# Patient Record
Sex: FEMALE
[De-identification: names, ages, dates, MRNs, and addresses within clinical notes are randomized per-mention and may not be internally consistent; named-entity substitution may affect disease eponyms.]

---
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
## 2025-06-10 ENCOUNTER — APPOINTMENT (OUTPATIENT)
Dept: NEUROLOGY | Facility: CLINIC | Age: 20
End: 2025-06-10
Payer: COMMERCIAL

## 2025-06-10 VITALS
WEIGHT: 100 LBS | SYSTOLIC BLOOD PRESSURE: 106 MMHG | HEIGHT: 63 IN | BODY MASS INDEX: 17.72 KG/M2 | DIASTOLIC BLOOD PRESSURE: 69 MMHG | HEART RATE: 82 BPM

## 2025-06-10 PROBLEM — Z87.42 HISTORY OF PCOS: Status: RESOLVED | Noted: 2025-06-10 | Resolved: 2025-06-10

## 2025-06-10 PROCEDURE — 99205 OFFICE O/P NEW HI 60 MIN: CPT

## 2025-08-04 ENCOUNTER — NON-APPOINTMENT (OUTPATIENT)
Age: 20
End: 2025-08-04

## 2025-08-07 ENCOUNTER — APPOINTMENT (OUTPATIENT)
Dept: NEUROLOGY | Facility: CLINIC | Age: 20
End: 2025-08-07
Payer: COMMERCIAL

## 2025-08-07 ENCOUNTER — TRANSCRIPTION ENCOUNTER (OUTPATIENT)
Age: 20
End: 2025-08-07

## 2025-08-07 VITALS
TEMPERATURE: 98.2 F | DIASTOLIC BLOOD PRESSURE: 76 MMHG | BODY MASS INDEX: 17.72 KG/M2 | HEART RATE: 69 BPM | SYSTOLIC BLOOD PRESSURE: 112 MMHG | HEIGHT: 63 IN | WEIGHT: 100 LBS

## 2025-08-07 DIAGNOSIS — G47.50 PARASOMNIA, UNSPECIFIED: ICD-10-CM

## 2025-08-07 DIAGNOSIS — F51.04 PSYCHOPHYSIOLOGIC INSOMNIA: ICD-10-CM

## 2025-08-07 DIAGNOSIS — R06.83 SNORING: ICD-10-CM

## 2025-08-07 PROCEDURE — 99204 OFFICE O/P NEW MOD 45 MIN: CPT

## 2025-08-20 ENCOUNTER — APPOINTMENT (OUTPATIENT)
Dept: PULMONOLOGY | Facility: CLINIC | Age: 20
End: 2025-08-20